# Patient Record
Sex: MALE | Race: WHITE | NOT HISPANIC OR LATINO | Employment: OTHER | URBAN - METROPOLITAN AREA
[De-identification: names, ages, dates, MRNs, and addresses within clinical notes are randomized per-mention and may not be internally consistent; named-entity substitution may affect disease eponyms.]

---

## 2023-11-29 ENCOUNTER — ANCILLARY PROCEDURE (OUTPATIENT)
Dept: RADIOLOGY | Facility: CLINIC | Age: 43
End: 2023-11-29
Payer: OTHER GOVERNMENT

## 2023-11-29 ENCOUNTER — OFFICE VISIT (OUTPATIENT)
Dept: URGENT CARE | Facility: CLINIC | Age: 43
End: 2023-11-29
Payer: OTHER GOVERNMENT

## 2023-11-29 VITALS
OXYGEN SATURATION: 98 % | DIASTOLIC BLOOD PRESSURE: 72 MMHG | RESPIRATION RATE: 16 BRPM | TEMPERATURE: 97.9 F | HEART RATE: 83 BPM | SYSTOLIC BLOOD PRESSURE: 102 MMHG

## 2023-11-29 DIAGNOSIS — S89.92XA INJURY OF LEFT KNEE, INITIAL ENCOUNTER: ICD-10-CM

## 2023-11-29 DIAGNOSIS — S83.412A SPRAIN OF MEDIAL COLLATERAL LIGAMENT OF LEFT KNEE, INITIAL ENCOUNTER: Primary | ICD-10-CM

## 2023-11-29 PROCEDURE — 73564 X-RAY EXAM KNEE 4 OR MORE: CPT | Mod: LT,FY

## 2023-11-29 PROCEDURE — 73564 X-RAY EXAM KNEE 4 OR MORE: CPT | Mod: LEFT SIDE | Performed by: RADIOLOGY

## 2023-11-29 PROCEDURE — 99213 OFFICE O/P EST LOW 20 MIN: CPT | Mod: 25

## 2023-11-29 RX ORDER — OMEPRAZOLE 20 MG/1
20 TABLET, DELAYED RELEASE ORAL
COMMUNITY

## 2023-11-29 RX ORDER — GABAPENTIN 100 MG/1
100 CAPSULE ORAL 3 TIMES DAILY
COMMUNITY

## 2023-11-29 RX ORDER — TIZANIDINE HYDROCHLORIDE 2 MG/1
2 CAPSULE, GELATIN COATED ORAL 3 TIMES DAILY
COMMUNITY

## 2023-11-29 NOTE — LETTER
November 29, 2023     Patient: Robert Flores   YOB: 1980   Date of Visit: 11/29/2023       To Whom It May Concern:    It is my medical opinion that Robert Flores should remain out of work until 12/3/23    If you have any questions or concerns, please don't hesitate to call.         Sincerely,        Michelle Coreas PA-C    CC: No Recipients

## 2023-11-29 NOTE — PROGRESS NOTES
[facility] NOTE:      Name: Robert Flores, 43 y.o.    CSN:[id number]   MRN:[id number]    PCP: No primary care provider on file.    ALL:  No Known Allergies    History:    Chief Complaint: L KNEE PAIN, SWELLING (X 2 DAYS)    Encounter Date: 11/29/2023      HPI: The history was obtained from the patient. Robert is a 43 y.o. male, who presents with a chief complaint of L KNEE PAIN, SWELLING (X 2 DAYS). He states he was working on a ladder at an incline. He went to step off the ladder to a different incline and states he missed and went down about 2 extra feet. He states he landed forward without twisting but had significant pain. He states later on he was putting together toys for his child and he knelt down on the L. Knee and felt a few pops and an increase in pain level. He states it is difficult to walk on the affected leg. He has taken ibuprofen and tylenol at home for pain relief. He states there is swelling to the medial and back of L. Knee areas. He denies discoloration, loss of sensation, numbness, tingling.     PMHx:    History reviewed. No pertinent past medical history.           Current Outpatient Medications   Medication Sig Dispense Refill    gabapentin (Neurontin) 100 mg capsule Take 1 capsule (100 mg) by mouth 3 times a day.      omeprazole OTC (PriLOSEC OTC) 20 mg EC tablet Take 1 tablet (20 mg) by mouth once daily in the morning. Take before meals. Do not crush, chew, or split.      tiZANidine (Zanaflex) 2 mg capsule Take 1 capsule (2 mg) by mouth 3 times a day.       No current facility-administered medications for this visit.         PMSx:  History reviewed. No pertinent surgical history.    Fam Hx: No family history on file.    SOC. Hx:     Social History     Socioeconomic History    Marital status:      Spouse name: Not on file    Number of children: Not on file    Years of education: Not on file    Highest education level: Not on file   Occupational  History    Not on file   Tobacco Use    Smoking status: Every Day     Packs/day: .5     Types: Cigarettes    Smokeless tobacco: Never   Substance and Sexual Activity    Alcohol use: Not on file    Drug use: Not on file    Sexual activity: Not on file   Other Topics Concern    Not on file   Social History Narrative    Not on file     Social Determinants of Health     Financial Resource Strain: Not on file   Food Insecurity: Not on file   Transportation Needs: Not on file   Physical Activity: Not on file   Stress: Not on file   Social Connections: Not on file   Intimate Partner Violence: Not on file   Housing Stability: Not on file         Vitals:    11/29/23 1121   BP: 102/72   Pulse: 83   Resp: 16   Temp: 36.6 °C (97.9 °F)   SpO2: 98%                Physical Exam  Constitutional:       Appearance: Normal appearance.   HENT:      Right Ear: Tympanic membrane normal.      Left Ear: Tympanic membrane normal.      Nose: Nose normal.      Mouth/Throat:      Mouth: Mucous membranes are moist.      Pharynx: Oropharynx is clear.   Eyes:      Conjunctiva/sclera: Conjunctivae normal.      Pupils: Pupils are equal, round, and reactive to light.   Cardiovascular:      Rate and Rhythm: Normal rate and regular rhythm.   Pulmonary:      Effort: Pulmonary effort is normal.      Breath sounds: Normal breath sounds.   Musculoskeletal:      Left lower leg: Swelling and tenderness present. Edema present.        Legs:       Comments: Moderate edema    Skin:     General: Skin is warm.   Neurological:      General: No focal deficit present.      Mental Status: He is alert and oriented to person, place, and time.   Psychiatric:         Mood and Affect: Mood normal.         Behavior: Behavior normal.       LABORATORY @ RADIOLOGICAL IMAGING (if done):     Wet read of xray shows no bony abnormality.     COURSE/MEDICAL DECISION MAKING:  Robert was seen today for l knee pain, swelling.  Diagnoses and all orders for this visit:  Sprain of medial  collateral ligament of left knee, initial encounter (Primary)  Injury of left knee, initial encounter  -     Cancel: XR knee left 3 views; Future      Based off of the clinical information at my disposal I would classify this injury as a MCL sprain. I  encouraged Robert to use the RICE protocol (rest, ice, compression, elevation) for the L leg. I am going to supply him with a set of crutches to allow for him to be ambulatory. We discussed using ibuprofen and tylenol for symptom relief. If symptoms do not improve in 1 week he should call our office and I will refer him to orthopedics for more follow up.        Michelle Coreas PA-C   Advanced Practice Provider  [facility]